# Patient Record
Sex: MALE | Race: WHITE | Employment: FULL TIME | URBAN - NONMETROPOLITAN AREA
[De-identification: names, ages, dates, MRNs, and addresses within clinical notes are randomized per-mention and may not be internally consistent; named-entity substitution may affect disease eponyms.]

---

## 2017-11-20 ENCOUNTER — ANESTHESIA (OUTPATIENT)
Dept: ENDOSCOPY | Age: 32
End: 2017-11-20
Payer: COMMERCIAL

## 2017-11-20 ENCOUNTER — HOSPITAL ENCOUNTER (OUTPATIENT)
Age: 32
Setting detail: OUTPATIENT SURGERY
Discharge: HOME | End: 2017-11-20
Attending: INTERNAL MEDICINE | Admitting: INTERNAL MEDICINE
Payer: COMMERCIAL

## 2017-11-20 ENCOUNTER — ANESTHESIA EVENT (OUTPATIENT)
Dept: ENDOSCOPY | Age: 32
End: 2017-11-20
Payer: COMMERCIAL

## 2017-11-20 VITALS
RESPIRATION RATE: 16 BRPM | OXYGEN SATURATION: 99 % | SYSTOLIC BLOOD PRESSURE: 103 MMHG | DIASTOLIC BLOOD PRESSURE: 61 MMHG

## 2017-11-20 VITALS
SYSTOLIC BLOOD PRESSURE: 121 MMHG | WEIGHT: 175 LBS | OXYGEN SATURATION: 98 % | BODY MASS INDEX: 24.5 KG/M2 | HEIGHT: 71 IN | TEMPERATURE: 97.8 F | RESPIRATION RATE: 20 BRPM | HEART RATE: 57 BPM | DIASTOLIC BLOOD PRESSURE: 69 MMHG

## 2017-11-20 PROCEDURE — 6360000002 HC RX W HCPCS: Performed by: NURSE ANESTHETIST, CERTIFIED REGISTERED

## 2017-11-20 PROCEDURE — 88305 TISSUE EXAM BY PATHOLOGIST: CPT

## 2017-11-20 PROCEDURE — 2500000003 HC RX 250 WO HCPCS: Performed by: NURSE ANESTHETIST, CERTIFIED REGISTERED

## 2017-11-20 PROCEDURE — 3700000001 HC ADD 15 MINUTES (ANESTHESIA): Performed by: INTERNAL MEDICINE

## 2017-11-20 PROCEDURE — 3609010300 HC COLONOSCOPY W/BIOPSY SINGLE/MULTIPLE: Performed by: INTERNAL MEDICINE

## 2017-11-20 PROCEDURE — 3700000000 HC ANESTHESIA ATTENDED CARE: Performed by: INTERNAL MEDICINE

## 2017-11-20 PROCEDURE — 2580000003 HC RX 258: Performed by: INTERNAL MEDICINE

## 2017-11-20 PROCEDURE — 45380 COLONOSCOPY AND BIOPSY: CPT | Performed by: INTERNAL MEDICINE

## 2017-11-20 PROCEDURE — 7100000010 HC PHASE II RECOVERY - FIRST 15 MIN: Performed by: INTERNAL MEDICINE

## 2017-11-20 PROCEDURE — 7100000011 HC PHASE II RECOVERY - ADDTL 15 MIN: Performed by: INTERNAL MEDICINE

## 2017-11-20 RX ORDER — SODIUM CHLORIDE, SODIUM LACTATE, POTASSIUM CHLORIDE, CALCIUM CHLORIDE 600; 310; 30; 20 MG/100ML; MG/100ML; MG/100ML; MG/100ML
INJECTION, SOLUTION INTRAVENOUS CONTINUOUS
Status: DISCONTINUED | OUTPATIENT
Start: 2017-11-20 | End: 2017-11-20 | Stop reason: HOSPADM

## 2017-11-20 RX ORDER — LIDOCAINE HYDROCHLORIDE 10 MG/ML
1 INJECTION, SOLUTION EPIDURAL; INFILTRATION; INTRACAUDAL; PERINEURAL ONCE
Status: DISCONTINUED | OUTPATIENT
Start: 2017-11-20 | End: 2017-11-20 | Stop reason: HOSPADM

## 2017-11-20 RX ORDER — LIDOCAINE HYDROCHLORIDE 20 MG/ML
INJECTION, SOLUTION INFILTRATION; PERINEURAL PRN
Status: DISCONTINUED | OUTPATIENT
Start: 2017-11-20 | End: 2017-11-20 | Stop reason: SDUPTHER

## 2017-11-20 RX ORDER — MESALAMINE 4 G/60ML
4 ENEMA RECTAL NIGHTLY
Qty: 1800 ML | Refills: 3 | Status: SHIPPED | OUTPATIENT
Start: 2017-11-20 | End: 2017-11-29

## 2017-11-20 RX ORDER — FENTANYL CITRATE 50 UG/ML
INJECTION, SOLUTION INTRAMUSCULAR; INTRAVENOUS PRN
Status: DISCONTINUED | OUTPATIENT
Start: 2017-11-20 | End: 2017-11-20 | Stop reason: SDUPTHER

## 2017-11-20 RX ORDER — PROPOFOL 10 MG/ML
INJECTION, EMULSION INTRAVENOUS PRN
Status: DISCONTINUED | OUTPATIENT
Start: 2017-11-20 | End: 2017-11-20 | Stop reason: SDUPTHER

## 2017-11-20 RX ORDER — MIDAZOLAM HYDROCHLORIDE 1 MG/ML
INJECTION INTRAMUSCULAR; INTRAVENOUS PRN
Status: DISCONTINUED | OUTPATIENT
Start: 2017-11-20 | End: 2017-11-20 | Stop reason: SDUPTHER

## 2017-11-20 RX ADMIN — FENTANYL CITRATE 50 MCG: 50 INJECTION, SOLUTION INTRAMUSCULAR; INTRAVENOUS at 11:29

## 2017-11-20 RX ADMIN — SODIUM CHLORIDE, SODIUM LACTATE, POTASSIUM CHLORIDE, AND CALCIUM CHLORIDE: 600; 310; 30; 20 INJECTION, SOLUTION INTRAVENOUS at 11:26

## 2017-11-20 RX ADMIN — MIDAZOLAM HYDROCHLORIDE 2 MG: 1 INJECTION, SOLUTION INTRAMUSCULAR; INTRAVENOUS at 11:27

## 2017-11-20 RX ADMIN — PROPOFOL 250 MG: 10 INJECTION, EMULSION INTRAVENOUS at 11:27

## 2017-11-20 RX ADMIN — LIDOCAINE HYDROCHLORIDE 40 MG: 20 INJECTION, SOLUTION INFILTRATION; PERINEURAL at 11:29

## 2017-11-20 NOTE — OP NOTE
Patient: Thierno Koch : 1985  Med Rec#: [id number] Acc#: [id number]   Primary Care Provider No primary care provider on file. Date of Procedure:  2017    Endoscopist: Luana Alicea MD    Referring Provider: Nick Puentes    Operation Performed: Colonoscopy with biopsy    Indications: rectal bleeding and loose stools for past few months, occ abdominal discomfort    Anesthesia:  Sedation was administered by anesthesia who monitored the patient during the procedure. I met with Thierno Koch prior to procedure. We discussed the procedure itself, and I have discussed the risks of endoscopy (including-- but not limited to-- pain, discomfort, bleeding potentially requiring second endoscopic procedure and/or blood transfusion, organ perforation requiring operative repair, damage to organs near the colon, infection, aspiration, cardiopulmonary/allergic reaction), benefits, indications to endoscopy. Additionally, we discussed options other than colonoscopy. The patient expressed understanding. All questions answered. The patient decided to proceed with the procedure. Signed informed consent was placed on the chart. Blood Loss: minimal    Withdrawal time: > 6 minutes  Bowel Prep: adequate     Complications: no immediate complications    DESCRIPTION OF PROCEDURE:     A time out was performed. After written informed consent was obtained, the patient was placed in the left lateral position. The perianal area was inspected, and a digital rectal exam was performed. A rectal exam was performed: normal tone, no palpable lesions. At this point, a forward viewing Olympus colonoscope was inserted into the anus and carefully advanced to the terminal ileum. The cecum was identified by the ileocecal valve and the appendiceal orifice. The colonoscope was then slowly withdrawn with careful inspection of the mucosa in a linear and circumferential fashion. The scope was retroflexed in the rectum.  Suction was utilized during the procedure to remove as much air as possible from the bowel. The colonoscope was removed from the patient, and the procedure was terminated. Findings are listed below. Findings: The mucosa appeared normal in the terminal ileum  Abnormal, friable, ulcerated mucosa was seen from the dentate line to the level of 15cm from the anus. The remaining sigmoid colon, left colon, transverse colon, and right colon appeared normal.   Endoscopically, this appeared most c/w ulcerative proctitis/colitis. Multiple random biopsies were obtained for histology. Other random colon biopsies were obtained from the normal appearing portions of the colon as well. Retroflexion in the rectum revealed no further abnormalities other than inflammation throughout the rectum. Recommendations:  1. Repeat colonoscopy: pending pathology  2. Await biopsy results-you will receive a letter with your results within 7-10 days  3. Start mesalamine enemas due to location of disease. 4. We will await pathology results. If symptoms cannot be controlled with NV therapy, then oral mesalamine may be warranted for adequate symptom improvement. 5.  Schedule f/u OV with me in 6-8 weeks. Findings and recommendations were discussed w/ the patient. A copy of the images was provided.     Juan Diego Braun MD  11/20/2017  11:48 AM

## 2017-11-20 NOTE — ANESTHESIA POSTPROCEDURE EVALUATION
Department of Anesthesiology  Postprocedure Note    Patient: Melodie Mckeon  MRN: [id number]  YOB: 1985  Date of evaluation: 11/20/2017  Time:  11:50 AM     Procedure Summary     Date:  11/20/17 Room / Location:  [facility] ENDO 10 / [facility]    Anesthesia Start:  1126 Anesthesia Stop:      Procedure:  COLONOSCOPY WITH BIOPSY (N/A ) Diagnosis:  (SCREEN (OA))    Surgeon:  Homer Richey MD Responsible Provider:  Vasquez Mae CRNA    Anesthesia Type:  general ASA Status:  1          Anesthesia Type: general    Ana Maria Phase I: Ana Maria Score: 10    Ana Maria Phase II:      Last vitals: Reviewed and per EMR flowsheets.        Anesthesia Post Evaluation    Patient location during evaluation: bedside  Patient participation: complete - patient participated  Level of consciousness: sleepy but conscious  Pain score: 0  Airway patency: patent  Nausea & Vomiting: no nausea and no vomiting  Complications: no  Cardiovascular status: hemodynamically stable and blood pressure returned to baseline  Respiratory status: acceptable and nasal cannula  Hydration status: stable

## 2017-11-20 NOTE — H&P
[]Comments:  Neuro/Mental Status:  [x]WNL  []Comments:  Abdominal:  [x]WNL    []Comments:  Other:   []WNL  []Comments:    Informed Consent:  The risks and benefits of the procedure have been discussed with either the patient or if they cannot consent, their representative. Assessment:  Patient examined and appropriate for planned sedation and procedure. Plan:  Proceed with planned sedation and procedure as above.     Adams Salinas MD

## 2017-11-20 NOTE — ANESTHESIA PRE PROCEDURE
Department of Anesthesiology  Preprocedure Note       Name:  Abilio Hinojosa   Age:  28 y.o.  :  1985                                          MRN:  [id number]         Date:  2017      Surgeon: Nina Martinez):  Lorelei Espinoza MD    Procedure: Procedure(s):  COLONOSCOPY DIAGNOSTIC OR SCREENING    Medications prior to admission:   Prior to Admission medications    Not on File       Current medications:    No current facility-administered medications for this encounter. Allergies: Allergies not on file    Problem List:  There is no problem list on file for this patient. Past Medical History:  No past medical history on file. Past Surgical History:  No past surgical history on file. Social History:    Social History   Substance Use Topics    Smoking status: Not on file    Smokeless tobacco: Not on file    Alcohol use Not on file                                Counseling given: Not Answered      Vital Signs (Current): There were no vitals filed for this visit. BP Readings from Last 3 Encounters:   No data found for BP       NPO Status:                                                                                 BMI:   Wt Readings from Last 3 Encounters:   No data found for Wt     There is no height or weight on file to calculate BMI.    CBC: No results found for: WBC, RBC, HGB, HCT, MCV, RDW, PLT    CMP: No results found for: NA, K, CL, CO2, BUN, CREATININE, GFRAA, AGRATIO, LABGLOM, GLUCOSE, PROT, CALCIUM, BILITOT, ALKPHOS, AST, ALT    POC Tests: No results for input(s): POCGLU, POCNA, POCK, POCCL, POCBUN, POCHEMO, POCHCT in the last 72 hours.     Coags: No results found for: PROTIME, INR, APTT    HCG (If Applicable): No results found for: PREGTESTUR, PREGSERUM, HCG, HCGQUANT     ABGs: No results found for: PHART, PO2ART, FPQ2DNC, YJI7VZQ, BEART, D9TCVGIB     Type & Screen (If Applicable):  No results found for: LABABO, [address]    Anesthesia Evaluation  Patient summary reviewed no history of anesthetic complications:   Airway: Mallampati: I  TM distance: >3 FB   Neck ROM: full  Mouth opening: > = 3 FB Dental: normal exam         Pulmonary:Negative Pulmonary ROS breath sounds clear to auscultation                             Cardiovascular:Negative CV ROS  Exercise tolerance: good (>4 METS),                     Neuro/Psych:   Negative Neuro/Psych ROS              GI/Hepatic/Renal:   (+) bowel prep,      (-) GERD       Endo/Other: Negative Endo/Other ROS                    Abdominal:           Vascular: negative vascular ROS. Anesthesia Plan      general     ASA 1       Induction: intravenous. Anesthetic plan and risks discussed with patient and spouse. Plan discussed with CRNA.     Attending anesthesiologist reviewed and agrees with Stephanie Camacho CRNA   11/20/2017

## 2017-11-21 ENCOUNTER — TELEPHONE (OUTPATIENT)
Dept: GASTROENTEROLOGY | Age: 32
End: 2017-11-21

## 2017-11-21 NOTE — TELEPHONE ENCOUNTER
I left a return voice mail on patient's cell phone stating we knew his medication needed authorization and we sent it in this morning (this was scanned into Epic) also as I was looking it up on CoverMyMeds, it was approved, [id number] effective 11/21/17-11/21/18. Advised on vm for patient to have his pharmacy rerun it to make it go through. I will also fax what I got off CoverMyMeds to the pharmacy.

## 2017-11-29 DIAGNOSIS — K51.30 ULCERATIVE RECTOSIGMOIDITIS WITHOUT COMPLICATION (HCC): Primary | ICD-10-CM

## 2017-11-29 RX ORDER — MESALAMINE 1.2 G/1
1200 TABLET, DELAYED RELEASE ORAL
Qty: 30 TABLET | Refills: 3 | Status: SHIPPED | OUTPATIENT
Start: 2017-11-29 | End: 2017-11-30

## 2017-11-29 RX ORDER — HYDROCORTISONE 100 MG/60ML
100 SUSPENSION RECTAL 2 TIMES DAILY
Qty: 42 ENEMA | Refills: 2 | Status: SHIPPED | OUTPATIENT
Start: 2017-11-29 | End: 2017-12-20

## 2017-11-30 RX ORDER — MESALAMINE 0.38 G/1
1.5 CAPSULE, EXTENDED RELEASE ORAL EVERY MORNING
Qty: 120 CAPSULE | Refills: 3 | Status: SHIPPED | OUTPATIENT
Start: 2017-11-30 | End: 2020-01-14

## 2017-12-04 ENCOUNTER — TELEPHONE (OUTPATIENT)
Dept: GASTROENTEROLOGY | Age: 32
End: 2017-12-04

## 2017-12-04 DIAGNOSIS — K51.211 ULCERATIVE PROCTITIS WITH RECTAL BLEEDING (HCC): Primary | ICD-10-CM

## 2017-12-04 RX ORDER — BALSALAZIDE DISODIUM 750 MG/1
2250 CAPSULE ORAL 3 TIMES DAILY
Qty: 90 CAPSULE | Refills: 3 | Status: SHIPPED | OUTPATIENT
Start: 2017-12-04 | End: 2020-01-14

## 2017-12-04 NOTE — TELEPHONE ENCOUNTER
\Colonoscopy completed per  on 11-20-17. [address] in [place] called today from [phone] stating the Amada Diaz will cost the patient a large co-pay, Rajani Pharmacist said his insurance recommends either Sulfasalazine or Balsalzide if  will approve one of the above.  cma      I have sent in an order for Balsalazide TID. Please call patient and let him know.  Thanks

## 2019-11-20 ENCOUNTER — TELEPHONE (OUTPATIENT)
Dept: GASTROENTEROLOGY | Age: 34
End: 2019-11-20

## 2020-01-14 ENCOUNTER — OFFICE VISIT (OUTPATIENT)
Dept: GASTROENTEROLOGY | Age: 35
End: 2020-01-14
Payer: COMMERCIAL

## 2020-01-14 VITALS
SYSTOLIC BLOOD PRESSURE: 98 MMHG | BODY MASS INDEX: 24.16 KG/M2 | WEIGHT: 172.6 LBS | OXYGEN SATURATION: 98 % | HEART RATE: 80 BPM | DIASTOLIC BLOOD PRESSURE: 62 MMHG | HEIGHT: 71 IN

## 2020-01-14 PROCEDURE — 99213 OFFICE O/P EST LOW 20 MIN: CPT | Performed by: INTERNAL MEDICINE

## 2020-01-14 RX ORDER — MESALAMINE 1.2 G/1
4800 TABLET, DELAYED RELEASE ORAL
Qty: 120 TABLET | Refills: 3 | Status: SHIPPED | OUTPATIENT
Start: 2020-01-14 | End: 2021-02-18 | Stop reason: SDUPTHER

## 2020-01-14 RX ORDER — PREDNISONE 10 MG/1
TABLET ORAL
Qty: 100 TABLET | Refills: 3 | Status: SHIPPED | OUTPATIENT
Start: 2020-01-14 | End: 2021-02-18 | Stop reason: ALTCHOICE

## 2020-01-14 SDOH — HEALTH STABILITY: MENTAL HEALTH: HOW MANY STANDARD DRINKS CONTAINING ALCOHOL DO YOU HAVE ON A TYPICAL DAY?: 1 OR 2

## 2020-01-14 NOTE — PROGRESS NOTES
Pulmonary effort is normal. No respiratory distress. Breath sounds: Normal breath sounds. No wheezing, rhonchi or rales. Abdominal:      General: Bowel sounds are normal. There is no distension. Palpations: Abdomen is soft. There is no hepatomegaly or mass. Tenderness: There is no abdominal tenderness. There is no guarding or rebound. Skin:     General: Skin is warm and dry. Coloration: Skin is not pale. Neurological:      Mental Status: He is alert and oriented to person, place, and time. Psychiatric:         Behavior: Behavior normal.         Thought Content: Thought content normal.         Labs:  No visits with results within 1 Month(s) from this visit. Latest known visit with results is:   No results found for any previous visit.

## 2020-02-17 ASSESSMENT — ENCOUNTER SYMPTOMS
CHEST TIGHTNESS: 0
VOMITING: 0
VOICE CHANGE: 0
COUGH: 0
BLOOD IN STOOL: 0
ABDOMINAL PAIN: 0
CONSTIPATION: 0
RECTAL PAIN: 0
BACK PAIN: 0
ALLERGIC/IMMUNOLOGIC NEGATIVE: 1
SHORTNESS OF BREATH: 0
TROUBLE SWALLOWING: 0
SORE THROAT: 0
EYES NEGATIVE: 1
DIARRHEA: 0
NAUSEA: 0

## 2021-02-18 ENCOUNTER — TELEMEDICINE (OUTPATIENT)
Dept: GASTROENTEROLOGY | Age: 36
End: 2021-02-18
Payer: COMMERCIAL

## 2021-02-18 DIAGNOSIS — K51.211 ULCERATIVE PROCTITIS WITH RECTAL BLEEDING (HCC): Primary | ICD-10-CM

## 2021-02-18 PROCEDURE — 99214 OFFICE O/P EST MOD 30 MIN: CPT | Performed by: NURSE PRACTITIONER

## 2021-02-18 RX ORDER — MESALAMINE 1.2 G/1
4800 TABLET, DELAYED RELEASE ORAL
Qty: 120 TABLET | Refills: 11 | Status: SHIPPED | OUTPATIENT
Start: 2021-02-18 | End: 2021-03-22

## 2021-02-18 ASSESSMENT — ENCOUNTER SYMPTOMS
BLOOD IN STOOL: 1
CONSTIPATION: 0
DIARRHEA: 1
ABDOMINAL PAIN: 1
SHORTNESS OF BREATH: 0
CHOKING: 0
ANAL BLEEDING: 1
COUGH: 0
ABDOMINAL DISTENTION: 0
VOMITING: 0
RECTAL PAIN: 0
TROUBLE SWALLOWING: 0
NAUSEA: 0

## 2021-02-18 NOTE — PROGRESS NOTES
2021    TELEHEALTH EVALUATION -- Audio/Visual (During PPTQD-69 public health emergency)    HPI:    Kendall Olmedo (:  1985) has requested an audio/video evaluation for the following concern(s):  Chief Complaint   Patient presents with    Rectal Bleeding       Pt being seen today for c/o rectal bleeding. Pt has hx ulcerative proctosigmoiditis. Last visit was a year ago and he was restarted on Lialda 4.8 gm daily and Prednisone taper at that time. He reports this did seem to help, however, he admits to not refilling Lialda and follow up was postponed due to COVID 19 pandemic. Today, pt has been off of medication with symptoms worsening in the last few months. He reports some rectal bleeding, bloating, frequent BMs. Stools are semi-solid, mucous laden with several BM a day. He also c/o abdominal pain that comes and goes. Last Colonoscopy 2017 - ulcerative proctitis    Review of Systems   Constitutional: Negative for activity change, appetite change, fatigue, fever and unexpected weight change. HENT: Negative for trouble swallowing. Respiratory: Negative for cough, choking and shortness of breath. Cardiovascular: Negative for chest pain. Gastrointestinal: Positive for abdominal pain, anal bleeding, blood in stool and diarrhea (loose). Negative for abdominal distention, constipation, nausea, rectal pain and vomiting. Allergic/Immunologic: Negative for food allergies. All other systems reviewed and are negative. Prior to Visit Medications    Medication Sig Taking?  Authorizing Provider   mesalamine (LIALDA) 1.2 g EC tablet Take 4 tablets by mouth daily (with breakfast)  Logan Hernandez MD       Social History     Tobacco Use    Smoking status: Never Smoker    Smokeless tobacco: Former User   Substance Use Topics    Alcohol use: Yes     Drinks per session: 1 or 2     Comment: weekly    Drug use: Never        Allergies   Allergen Reactions    Bactrim [Sulfamethoxazole-Trimethoprim] Other (See Comments)     As a child   ,   Past Medical History:   Diagnosis Date    Ulcerative proctitis ([facility] [address]    ,   Past Surgical History:   Procedure Laterality Date    HERNIA REPAIR      at age [age] [de-identified]   Ivan Gudino HERNIA REPAIR  01/09/2020    Dr Alexander Alfaro Right     pinning    [place] COLONOSCOPY W/BIOPSY SINGLE/MULTIPLE N/A 11/20/2017    Dr Erick Tinsley-ulcerative proctitis   ,   Social History     Tobacco Use    Smoking status: Never Smoker    Smokeless tobacco: Former User   Substance Use Topics    Alcohol use: Yes     Drinks per session: 1 or 2     Comment: weekly    Drug use: Never   ,   Family History   Problem Relation Age of Onset    Other Maternal Grandfather         colitis    Colon Cancer Neg Hx     Colon Polyps Neg Hx     Esophageal Cancer Neg Hx     Liver Cancer Neg Hx     Liver Disease Neg Hx     Rectal Cancer Neg Hx     Stomach Cancer Neg Hx        PHYSICAL EXAMINATION:  [ INSTRUCTIONS:  \"[x]\" Indicates a positive item  \"[]\" Indicates a negative item  -- DELETE ALL ITEMS NOT EXAMINED]  Vital Signs: (As obtained by patient/caregiver or practitioner observation)    Blood pressure-  Heart rate-    Respiratory rate-    Temperature-  Pulse oximetry-     Constitutional: [x] Appears well-developed and well-nourished [x] No apparent distress      [] Abnormal-   Mental status  [x] Alert and awake  [x] Oriented to person/place/time [x]Able to follow commands      Eyes:  EOM    [x]  Normal  [] Abnormal-  Sclera  [x]  Normal  [] Abnormal -         Discharge [x]  None visible  [] Abnormal -    HENT:   [x] Normocephalic, atraumatic.   [] Abnormal   [x] Mouth/Throat: Mucous membranes are moist.     External Ears [x] Normal  [] Abnormal-     Neck: [x] No visualized mass     Pulmonary/Chest: [x] Respiratory effort normal.  [x] No visualized signs of difficulty breathing or respiratory distress        [] Abnormal-      Musculoskeletal:   [x] Normal gait with no signs of ataxia         [x] Normal range of motion of neck        [] Abnormal-       Neurological:        [x] No Facial Asymmetry (Cranial nerve 7 motor function) (limited exam to video visit)          [x] No gaze palsy        [] Abnormal-         Skin:        [x] No significant exanthematous lesions or discoloration noted on facial skin         [] Abnormal-            Psychiatric:       [x] Normal Affect [x] No Hallucinations        [] Abnormal-     Other pertinent observable physical exam findings-     ASSESSMENT/PLAN:  1. Ulcerative proctitis with rectal bleeding (HCC)    - Start Lialda 4.8 gm po daily   - Fecal calprotectin and labs (CBC,CMP, ESR, CRP)  - Follow up in 6-8 weeks or sooner if needed  - Call with any questions or concerns  - Schedule colonoscopy  Instruct on bowel prep. Nothing to eat or drink after midnight the day of the exam.  Unable to drive for 24 hours after the procedure. No aspirin or nonsteroidal anti-inflammatories for 5 days before procedure. I have discussed the benefits, alternatives, and risks (including bleeding, perforation and death)  for pursuing Endoscopy (EGD/Colonscopy/EUS/ERCP) with the patient and they are willing to continue. We also discussed the need for anesthesia, IV access, proper dietary changes, medication changes if necessary, and need for bowel prep (if ordered) prior to their Endoscopic procedure. They are aware they must have someone accompany them to their scheduled procedure to drive them home - they agree to the above and are willing to continue. Return for procedure follow up or sooner if needed. Linnea Alonso is a 28 y.o. male being evaluated by a Virtual Visit (video visit) encounter to address concerns as mentioned above. A caregiver was present when appropriate.  Due to this being a TeleHealth encounter (During GBTWA-85 public health emergency), evaluation of the following organ systems was limited: Vitals/Constitutional/EENT/Resp/CV/GI//MS/Neuro/Skin/Heme-Lymph-Imm. Pursuant to the emergency declaration under the [address] [place] [address], [address] [place] [address] and the [employer] Act, this Virtual Visit was conducted with patient's (and/or legal guardian's) consent, to reduce the patient's risk of exposure to COVID-19 and provide necessary medical care. The patient (and/or legal guardian) has also been advised to contact this office for worsening conditions or problems, and seek emergency medical treatment and/or call 911 if deemed necessary. Patient identification was verified at the start of the visit: Yes    Total time spent on this encounter: Not billed by time    Services were provided through a video synchronous discussion virtually to substitute for in-person clinic visit. Patient and provider were located at their individual homes. --Shon Duane, APRN - NP on 2/18/2021 at 11:33 AM    An electronic signature was used to authenticate this note.

## 2021-02-18 NOTE — PATIENT INSTRUCTIONS
Schedule colonoscopy. No aspirin, ibuprofen, naproxen, fish oil or vitamin E for 5 days before procedure. Do not eat or drink after midnight the day of the procedure. Allowed medications can be taken with a small sip of water. Please review your prep instructions for allowed medications. You will not be able to drive for 24 hours after the procedure due to sedation. You must have a responsible adult, 25 year or older, to accompany you and drive you home the day of your procedure. If you are on blood thinners, clearance from the prescribing physician will be obtained before your procedure is scheduled. If it is determined it is not safe to hold these medications for a short time an alternative procedure for evaluation may be recommended. Risks of colonoscopy include, but are not limited to, perforation, bleeding, and infection, Risk of perforation and bleeding are increased if there is a polyp removed. Anesthesia risks will be reviewed with you before the procedure by a member of the anesthesia department. Your physician may also schedule a follow up appointment with the nurse practitioner to discuss pathology, symptoms or to check if you have had any problems related to your procedure. If you prefer not to return to the office after your procedure please discuss this with your physician on the day of your colonoscopy. The physician will talk with you and/or your family after the procedure is completed. Final recommendations are based on the pathologist report if biopsies or specimens are taken. If polyps are removed during the procedure they will be sent to a pathologist for analysis. Unless you have a follow up appointment scheduled, you will be notified by mail of the pathology results within 4 weeks. If you have not received results after 4 weeks you may call the office to obtain this information. For Colonoscopy:   You will be given specific directions regarding colonoscopy, continue to drink plenty of clear fluids. It is important   to keep yourself hydrated before the exam.     Please follow all instructions as provided for cleansing the bowel. Failure to have an adequately prepped colon may cause you to have incomplete exam with further testing required.      [email or web address]

## 2021-03-18 ENCOUNTER — OFFICE VISIT (OUTPATIENT)
Age: 36
End: 2021-03-18

## 2021-03-18 ENCOUNTER — ANESTHESIA EVENT (OUTPATIENT)
Dept: OPERATING ROOM | Age: 36
End: 2021-03-18

## 2021-03-18 DIAGNOSIS — Z11.59 SCREENING FOR VIRAL DISEASE: Primary | ICD-10-CM

## 2021-03-18 PROCEDURE — 99999 PR OFFICE/OUTPT VISIT,PROCEDURE ONLY: CPT | Performed by: NURSE PRACTITIONER

## 2021-03-20 LAB — SARS-COV-2, NAA: NOT DETECTED

## 2021-03-22 ENCOUNTER — HOSPITAL ENCOUNTER (OUTPATIENT)
Age: 36
Setting detail: OUTPATIENT SURGERY
Discharge: HOME | End: 2021-03-22
Attending: INTERNAL MEDICINE | Admitting: INTERNAL MEDICINE
Payer: COMMERCIAL

## 2021-03-22 ENCOUNTER — APPOINTMENT (OUTPATIENT)
Dept: OPERATING ROOM | Age: 36
End: 2021-03-22

## 2021-03-22 ENCOUNTER — HOSPITAL ENCOUNTER (OUTPATIENT)
Age: 36
Setting detail: SPECIMEN
Discharge: HOME | End: 2021-03-22
Payer: COMMERCIAL

## 2021-03-22 ENCOUNTER — ANESTHESIA (OUTPATIENT)
Dept: OPERATING ROOM | Age: 36
End: 2021-03-22

## 2021-03-22 VITALS — OXYGEN SATURATION: 55 % | SYSTOLIC BLOOD PRESSURE: 86 MMHG | DIASTOLIC BLOOD PRESSURE: 42 MMHG

## 2021-03-22 VITALS
SYSTOLIC BLOOD PRESSURE: 95 MMHG | WEIGHT: 170 LBS | TEMPERATURE: 97.5 F | HEIGHT: 71 IN | BODY MASS INDEX: 23.8 KG/M2 | RESPIRATION RATE: 18 BRPM | HEART RATE: 57 BPM | DIASTOLIC BLOOD PRESSURE: 60 MMHG | OXYGEN SATURATION: 100 %

## 2021-03-22 PROCEDURE — G8918 PT W/O PREOP ORDER IV AB PRO: HCPCS

## 2021-03-22 PROCEDURE — 45380 COLONOSCOPY AND BIOPSY: CPT

## 2021-03-22 PROCEDURE — 45380 COLONOSCOPY AND BIOPSY: CPT | Performed by: INTERNAL MEDICINE

## 2021-03-22 PROCEDURE — 88305 TISSUE EXAM BY PATHOLOGIST: CPT

## 2021-03-22 PROCEDURE — G8907 PT DOC NO EVENTS ON DISCHARG: HCPCS

## 2021-03-22 RX ORDER — MIDAZOLAM HYDROCHLORIDE 1 MG/ML
INJECTION INTRAMUSCULAR; INTRAVENOUS PRN
Status: DISCONTINUED | OUTPATIENT
Start: 2021-03-22 | End: 2021-03-22 | Stop reason: SDUPTHER

## 2021-03-22 RX ORDER — SODIUM CHLORIDE 9 MG/ML
INJECTION, SOLUTION INTRAVENOUS CONTINUOUS PRN
Status: DISCONTINUED | OUTPATIENT
Start: 2021-03-22 | End: 2021-03-22 | Stop reason: SDUPTHER

## 2021-03-22 RX ORDER — PROPOFOL 10 MG/ML
INJECTION, EMULSION INTRAVENOUS PRN
Status: DISCONTINUED | OUTPATIENT
Start: 2021-03-22 | End: 2021-03-22 | Stop reason: SDUPTHER

## 2021-03-22 RX ORDER — SODIUM CHLORIDE 9 MG/ML
INJECTION, SOLUTION INTRAVENOUS CONTINUOUS
Status: DISCONTINUED | OUTPATIENT
Start: 2021-03-22 | End: 2021-03-22 | Stop reason: HOSPADM

## 2021-03-22 RX ORDER — LIDOCAINE HYDROCHLORIDE 10 MG/ML
INJECTION, SOLUTION INFILTRATION; PERINEURAL PRN
Status: DISCONTINUED | OUTPATIENT
Start: 2021-03-22 | End: 2021-03-22 | Stop reason: SDUPTHER

## 2021-03-22 RX ADMIN — SODIUM CHLORIDE: 9 INJECTION, SOLUTION INTRAVENOUS at 09:25

## 2021-03-22 RX ADMIN — LIDOCAINE HYDROCHLORIDE 40 MG: 10 INJECTION, SOLUTION INFILTRATION; PERINEURAL at 09:59

## 2021-03-22 RX ADMIN — SODIUM CHLORIDE: 9 INJECTION, SOLUTION INTRAVENOUS at 09:46

## 2021-03-22 RX ADMIN — PROPOFOL 230 MG: 10 INJECTION, EMULSION INTRAVENOUS at 09:59

## 2021-03-22 RX ADMIN — MIDAZOLAM HYDROCHLORIDE 2 MG: 1 INJECTION INTRAMUSCULAR; INTRAVENOUS at 09:49

## 2021-03-22 NOTE — H&P
Patient Name: Jessica Knowles  : 1985  MRN: [id number]  DATE: 21    Allergies: Allergies   Allergen Reactions    Bactrim [Sulfamethoxazole-Trimethoprim] Other (See Comments)     As a child        ENDOSCOPY  History and Physical    Procedure:    [x] Diagnostic Colonoscopy       [] Screening Colonoscopy  [] EGD      [] ERCP      [] EUS       [] Other    [x] Previous office notes/History and Physical reviewed from the patients chart. Please see EMR for further details of HPI. I have examined the patient's status immediately prior to the procedure and:      Indications/HPI:    []Abdominal Pain   []Cancer- GI/Lung     []Fhx of colon CA/polyps  []History of Polyps  []Barretts            []Melena  []Abnormal Imaging              []Dysphagia              []Persistent Pneumonia   []Anemia                            []Food Impaction        []History of Polyps  [] GI Bleed             []Pulmonary nodule/Mass   []Change in bowel habits []Heartburn/Reflux  [x]Rectal Bleed (BRBPR)  []Chest Pain - Non Cardiac []Heme (+) Stool []Ulcers  []Constipation  []Hemoptysis  []Varices  []Diarrhea  []Hypoxemia    []Nausea/Vomiting   []Screening   [x]Crohns/Colitis  []Other:     Anesthesia:   [x] MAC [] Moderate Sedation   [] General   [] None     ROS: 12 pt Review of Symptoms was negative unless mentioned above    Medications:   Prior to Admission medications    Medication Sig Start Date End Date Taking?  Authorizing Provider   mesalamine (LIALDA) 1.2 g EC tablet Take 4 tablets by mouth daily (with breakfast) 2/18/21 3/20/21  JOSH Brice - NP       Past Medical History:  Past Medical History:   Diagnosis Date    [facility]ative [facility]        Past Surgical History:  Past Surgical History:   Procedure Laterality Date    HERNIA REPAIR      at age [age] [de-identified]   Elfreda Almont HERNIA REPAIR  2020    Dr Kyler Clemens Right     pinning    [facility] COL[place] W/BIOPSY SINGLE/MULTIPLE N/A 2017    Dr Kavya Haynes Tinsley-ulcerative proctitis       Social History:  Social History     Tobacco Use    Smoking status: Never Smoker    Smokeless tobacco: Former User   Substance Use Topics    Alcohol use: Yes     Drinks per session: 1 or 2     Comment: weekly    Drug use: Never       Vital Signs: There were no vitals filed for this visit. Physical Exam:  Cardiac:  [x]WNL  []Comments:  Pulmonary:  [x]WNL   []Comments:  Neuro/Mental Status:  [x]WNL  []Comments:  Abdominal:  [x]WNL    []Comments:  Other:   []WNL  []Comments:    Informed Consent:  The risks and benefits of the procedure have been discussed with either the patient or if they cannot consent, their representative. Assessment:  Patient examined and appropriate for planned sedation and procedure. Plan:  Proceed with planned sedation and procedure as above. Debbie Polanco am scribing for and in the presence of Dr. Evelyne Banks MD.  Electronically signed by Robert Chavez RN on 3/22/2021 at 9:12 AM    I personally performed the services described in this documentation as scribed by Ana Nguyen, and it appears accurate and complete.      Evelyne Banks MD  3/22/2021

## 2021-03-22 NOTE — ANESTHESIA POSTPROCEDURE EVALUATION
Department of Anesthesiology  Postprocedure Note    Patient: Petrona Da Silva  MRN: [id number]  YOB: 1985  Date of evaluation: 3/22/2021  Time:  10:19 AM     Procedure Summary     Date: 03/22/21 Room / Location: [facility] ENDO 02 / [address] [place] [address]    Anesthesia Start: 3931 Anesthesia Stop:     Procedure: COLONOSCOPY WITH BIOPSY (N/A Abdomen) Diagnosis: (HX ULCERATIVE PROCTOSIGMOIDITIS, RB)    Surgeons: Louise Babin MD Responsible Provider: JOSH Camacho CRNA    Anesthesia Type: general, TIVA ASA Status: 1          Anesthesia Type: No value filed. Ana Maria Phase I:      Ana Maria Phase II:      Last vitals: Reviewed and per EMR flowsheets.        Anesthesia Post Evaluation    Patient location during evaluation: bedside  Patient participation: complete - patient participated  Level of consciousness: sleepy but conscious  Pain score: 0  Airway patency: patent  Nausea & Vomiting: no nausea and no vomiting  Complications: no  Cardiovascular status: hemodynamically stable and blood pressure returned to baseline  Respiratory status: acceptable  Hydration status: stable

## 2021-03-22 NOTE — OP NOTE
Patient: Junior Montoya : 1985  Med Rec#: [id number] Acc#: [id number]   Primary Care Provider No primary care provider on file. Date of Procedure:  3/22/2021    Endoscopist: Gris Bruce MD    Referring Provider: JOSH Ortiz    Operation Performed: Colonoscopy with biopsy     Indications: rectal bleeding, hx of ulcerative proctitis     Anesthesia:  Sedation was administered by anesthesia who monitored the patient during the procedure. I met with Junior Montoya prior to procedure. We discussed the procedure itself, and I have discussed the risks of endoscopy (including-- but not limited to-- pain, discomfort, bleeding potentially requiring second endoscopic procedure and/or blood transfusion, organ perforation requiring operative repair, damage to organs near the colon, infection, aspiration, cardiopulmonary/allergic reaction), benefits, indications to endoscopy. Additionally, we discussed options other than colonoscopy. The patient expressed understanding. All questions answered. The patient decided to proceed with the procedure. Signed informed consent was placed on the chart. Blood Loss: minimal    Withdrawal time: > 6 min  Bowel Prep: adequate     Complications: no immediate complications    DESCRIPTION OF PROCEDURE:     A time out was performed. After written informed consent was obtained, the patient was placed in the left lateral position. The perianal area was inspected, and a digital rectal exam was performed. A rectal exam was performed: normal tone, no palpable lesions. At this point, a forward viewing Olympus colonoscope was inserted into the anus and carefully advanced to the terminal ileum. The cecum was identified by the ileocecal valve and the appendiceal orifice. The colonoscope was then slowly withdrawn with careful inspection of the mucosa in a linear and circumferential fashion. The scope was retroflexed in the rectum.  Suction was utilized during the procedure to remove as much air as possible from the bowel. The colonoscope was removed from the patient, and the procedure was terminated. Findings are listed below. Findings: The terminal ileum had no obvious ulcerations, there was some questionable mucosal changes of inflammation- biopsied. There were circumferential mucosal changes from the anal verge up to 20 cm, consistent with ulcerative proctitis- biopsied. .    The remaining right colon, transverse colon, and left colon appeared normal- biopsied separately. Retroflexion in the rectum appeared otherwise normal.         Recommendations:  1. Repeat colonoscopy: pending pathology. 2. Await biopsy results-you will receive a letter with your results. 3. Continue Lialda at 4.8gm per day  4. Check CBC, CMP, ESR, CRP and Stool Calprotectin as ordered. 5. Schedule f/u OV with ANNALISA MORENO in 6 weeks. Findings and recommendations were discussed w/ the patient. A copy of the images was provided. Ken Odom am scribing for and in the presence of Dr. Cindy Gonzales MD.  Electronically signed by Pedrito Tapia RN on 3/22/2021 at 9:12 AM    I personally performed the services described in this documentation as scribed by Danisha Bazzi, and it appears accurate and complete.      Cindy Gonzales MD  3/22/2021

## 2021-03-22 NOTE — ANESTHESIA PRE PROCEDURE
Department of Anesthesiology  Preprocedure Note       Name:  Nelli Shahid   Age:  28 y.o.  :  1985                                          MRN:  [id number]         Date:  3/22/2021      Surgeon: Roro Maurice):  Melody Garner MD    Procedure: Procedure(s):  COLONOSCOPY DIAGNOSTIC OR SCREENING    Medications prior to admission:   Prior to Admission medications    Medication Sig Start Date End Date Taking? Authorizing Provider   mesalamine (LIALDA) 1.2 g EC tablet Take 4 tablets by mouth daily (with breakfast) 2/18/21 3/20/21  JOSH Salazar NP       Current medications:    No current outpatient medications on file. No current facility-administered medications for this visit. Allergies: Allergies   Allergen Reactions    Bactrim [Sulfamethoxazole-Trimethoprim] Other (See Comments)     As a child       Problem List:    Patient Active Problem List   Diagnosis Code    Ulcerative proctitis with rectal bleeding ([address] K51.211       Past Medical History:        Diagnosis Date    Ulcerative proctitis ([address]        Past Surgical History:        Procedure Laterality Date    HERNIA REPAIR      at age [age] [de-identified]   [facility] HERNIA REPAIR  2020    Dr eLtty López Right     pinning    [place] COLONOSCOPY W/BIOPSY SINGLE/MULTIPLE N/A 2017    Dr Mayito Tinsley-ulcerative proctitis       Social History:    Social History     Tobacco Use    Smoking status: Never Smoker    Smokeless tobacco: Former User   Substance Use Topics    Alcohol use: Yes     Drinks per session: 1 or 2     Comment: weekly                                Counseling given: Not Answered      Vital Signs (Current): There were no vitals filed for this visit.                                            BP Readings from Last 3 Encounters:   20 98/62   17 121/69   17 103/61       NPO Status:                                                                                 BMI:   Wt Readings from Last 3 Encounters: 01/14/20 172 lb 9.6 oz (78.3 kg)   11/20/17 175 lb (79.4 kg)     There is no height or weight on file to calculate BMI.    CBC: No results found for: WBC, RBC, HGB, HCT, MCV, RDW, PLT    CMP: No results found for: NA, K, CL, CO2, BUN, CREATININE, GFRAA, AGRATIO, LABGLOM, GLUCOSE, PROT, CALCIUM, BILITOT, ALKPHOS, AST, ALT    POC Tests: No results for input(s): POCGLU, POCNA, POCK, POCCL, POCBUN, POCHEMO, POCHCT in the last 72 hours. Coags: No results found for: PROTIME, INR, APTT    HCG (If Applicable): No results found for: PREGTESTUR, PREGSERUM, HCG, HCGQUANT     ABGs: No results found for: PHART, PO2ART, NWJ9INC, SQM7RNG, BEART, Y4CFFZWX     Type & Screen (If Applicable):  No results found for: LABABO, [address]    Anesthesia Evaluation  Patient summary reviewed no history of anesthetic complications:   Airway: Mallampati: I  TM distance: >3 FB   Neck ROM: full  Mouth opening: > = 3 FB Dental: normal exam         Pulmonary: breath sounds clear to auscultation                            ROS comment: Former smokeless tobacco user   Cardiovascular:Negative CV ROS  Exercise tolerance: good (>4 METS),            Beta Blocker:  Not on Beta Blocker         Neuro/Psych:   Negative Neuro/Psych ROS              GI/Hepatic/Renal:   (+) bowel prep,      (-) GERD      ROS comment: etoh use  Ulcerative proctitis . Endo/Other: Negative Endo/Other ROS                    Abdominal:           Vascular: negative vascular ROS. Anesthesia Plan      general and TIVA     ASA 1       Induction: intravenous. Anesthetic plan and risks discussed with patient.                       JOSH Mackey - CRNA   3/22/2021

## 2021-03-31 ENCOUNTER — TELEPHONE (OUTPATIENT)
Dept: GASTROENTEROLOGY | Age: 36
End: 2021-03-31

## 2021-03-31 NOTE — TELEPHONE ENCOUNTER
Left vm for pt that he is still needing to complete the fecal calprotectin, order is in Epic. Pt advised to call back if he has questions.

## 2021-04-14 ENCOUNTER — TELEPHONE (OUTPATIENT)
Dept: GASTROENTEROLOGY | Age: 36
End: 2021-04-14

## 2021-04-14 RX ORDER — MESALAMINE 1000 MG/1
1000 SUPPOSITORY RECTAL NIGHTLY
Qty: 30 SUPPOSITORY | Refills: 2 | Status: SHIPPED | OUTPATIENT
Start: 2021-04-14 | End: 2021-05-14

## 2021-04-14 NOTE — TELEPHONE ENCOUNTER
Recommendations:  1. Repeat colonoscopy: pending pathology. 2. Await biopsy results-you will receive a letter with your results. 3. Continue Lialda at 4.8gm per day  4. Check CBC, CMP, ESR, CRP and Stool Calprotectin as ordered. 5. Schedule f/u OV with ANNALISA MORENO in 6 weeks.      Findings and recommendations were discussed w/ the patient. A copy of the images was provided.     I, Marilynn Ramirez, am scribing for and in the presence of Dr. Emily Morgan MD.  Electronically signed by Pauline Duverney, RN on 3/22/2021 at 9:12 AM     I personally performed the services described in this documentation as scribed by Marilynn Ramirez, and it appears accurate and complete.      Emily Morgan MD  3/22/2021       Patient called from [phone]  Patient has FU as VV with [id number] on 4-19-21. CLN per  3-22-21, see above. Patient said Polina Valdez was suppose to have sent Rx for Suppositories to [facility] in [place] and Rx is not there. I will forward to  and  jaquelin to review. Patient is asking for a return call.   jaquelin

## 2021-04-14 NOTE — TELEPHONE ENCOUNTER
Yuvonne Duane can you send this in for patient please? Thanks    Pt advised Rupesh Serrano sent in meds, and reminded him again to do the stool testing.

## 2021-05-04 ENCOUNTER — TELEPHONE (OUTPATIENT)
Dept: GASTROENTEROLOGY | Age: 36
End: 2021-05-04

## 2021-05-05 ENCOUNTER — TELEPHONE (OUTPATIENT)
Dept: GASTROENTEROLOGY | Age: 36
End: 2021-05-05

## 2021-05-05 NOTE — TELEPHONE ENCOUNTER
05-04-21 Called spoke with the patient his next apt is on Friday May 7th. He is still needing his Fecal stool test done. Moved apt to 05-28-21. He will try to come by Friday with the stool sample and drop off to the lab. Patient apologized as that he and his wife have been tied  Up with their twins that have been running high temps this week.  Cv

## 2022-12-12 ENCOUNTER — TELEPHONE (OUTPATIENT)
Dept: GASTROENTEROLOGY | Age: 37
End: 2022-12-12

## 2022-12-12 NOTE — TELEPHONE ENCOUNTER
Bambi Kirsten called to reschedule a  appt with Maria Eugenia Barragan that he canceled in Sept. Didn't know if he would still need the fecal calprotectin prior to this appt. .     Please be advised that the best time to call him to accommodate their needs is Anytime. Thank you.

## 2022-12-12 NOTE — TELEPHONE ENCOUNTER
12-12-22 Called [facility] x 2 for the patient to return a call to the office. The lab that wasn't done was from .

## 2023-02-02 ENCOUNTER — OFFICE VISIT (OUTPATIENT)
Dept: GASTROENTEROLOGY | Age: 38
End: 2023-02-02
Payer: COMMERCIAL

## 2023-02-02 VITALS
BODY MASS INDEX: 23.94 KG/M2 | HEART RATE: 70 BPM | OXYGEN SATURATION: 98 % | DIASTOLIC BLOOD PRESSURE: 70 MMHG | WEIGHT: 171 LBS | HEIGHT: 71 IN | SYSTOLIC BLOOD PRESSURE: 115 MMHG

## 2023-02-02 DIAGNOSIS — K51.211 ULCERATIVE PROCTITIS WITH RECTAL BLEEDING (HCC): Primary | ICD-10-CM

## 2023-02-02 PROCEDURE — 99213 OFFICE O/P EST LOW 20 MIN: CPT | Performed by: NURSE PRACTITIONER

## 2023-02-02 RX ORDER — MESALAMINE 1.2 G/1
4800 TABLET, DELAYED RELEASE ORAL
Qty: 360 TABLET | Refills: 3 | Status: SHIPPED | OUTPATIENT
Start: 2023-02-02 | End: 2023-06-02

## 2023-02-02 ASSESSMENT — ENCOUNTER SYMPTOMS
CHOKING: 0
CONSTIPATION: 0
ABDOMINAL DISTENTION: 0
DIARRHEA: 1
COUGH: 0
TROUBLE SWALLOWING: 0
NAUSEA: 0
VOMITING: 0
BLOOD IN STOOL: 0
SHORTNESS OF BREATH: 0
ABDOMINAL PAIN: 0
RECTAL PAIN: 0
ANAL BLEEDING: 0

## 2023-02-02 NOTE — PROGRESS NOTES
Subjective:     Patient ID: Yg Lawrence is a 40 y.o. male  PCP: No primary care provider on file. Referring Provider: No ref. provider found    HPI  Patient presents to the office today with the following complaints: Follow-up      Pt seen today for follow up. Hx ulcerative proctitis. Today, pt denies any abdominal pain, bleeding. He reports 2-4 BM a day. Stools are described as loose, watery. Denies any nocturnal diarrhea or mucous in stool. Pt previously on Lialda 4.8 gm daily. He has been out of this medication for a while now. Last Colonoscopy 2021 [facility]    Assessment:     1. Ulcerative proctitis with rectal bleeding (HCC)            Plan:   - Restart Lialda 4.8 gm daily  - Follow up yearly or sooner if needed  - Plan for repeat Colonoscopy at next OV   - Call with any questions or concerns      Orders  No orders of the defined types were placed in this encounter.     Medications  Orders Placed This Encounter   Medications    mesalamine (LIALDA) 1.2 g EC tablet     Sig: Take 4 tablets by mouth daily (with breakfast)     Dispense:  360 tablet     Refill:  3         Patient History:     Past Medical History:   Diagnosis Date    Ulcerative proctitis ([facility]        Past Surgical History:   Procedure Laterality Date    COLONOSCOPY N/A 03/22/2021    Dr Jones Severs    COLONOSCOPY  11/20/2017    Dr Fabian Tinsley-Active proctitis, BCM X 2    HERNIA REPAIR      at age [age] [de-identified]    HERNIA REPAIR  01/09/2020    Dr Sloan Shallow Right     pinning    [place] COLONOSCOPY W/BIOPSY SINGLE/MULTIPLE N/A 11/20/2017    Dr Fabian Tinsley-Ulcerative proctitis       Family History   Problem Relation Age of Onset    Other Maternal Grandfather         colitis    Colon Cancer Neg Hx     Colon Polyps Neg Hx     Esophageal Cancer Neg Hx     Liver Cancer Neg Hx     Liver Disease Neg Hx     Rectal Cancer Neg Hx     Stomach Cancer Neg Hx        Social History     Socioeconomic History    Marital status:    Tobacco Use Smoking status: Never    Smokeless tobacco: Former   Vaping Use    Vaping Use: Never used   Substance and Sexual Activity    Alcohol use: Yes     Comment: weekly    Drug use: Never       Current Outpatient Medications   Medication Sig Dispense Refill    sertraline (ZOLOFT) 50 MG tablet TAKE 1 TABLET BY MOUTH DAILY      mesalamine (LIALDA) 1.2 g EC tablet Take 4 tablets by mouth daily (with breakfast) 360 tablet 3    mesalamine (CANASA) 1000 MG suppository Place 1 suppository rectally nightly For 4 weeks 30 suppository 2     No current facility-administered medications for this visit. Allergies   Allergen Reactions    Bactrim [Sulfamethoxazole-Trimethoprim] Other (See Comments)     As a child       Review of Systems   Constitutional:  Negative for activity change, appetite change, fatigue, fever and unexpected weight change. HENT:  Negative for trouble swallowing. Respiratory:  Negative for cough, choking and shortness of breath. Cardiovascular:  Negative for chest pain. Gastrointestinal:  Positive for diarrhea. Negative for abdominal distention, abdominal pain, anal bleeding, blood in stool, constipation, nausea, rectal pain and vomiting. Allergic/Immunologic: Negative for food allergies. All other systems reviewed and are negative. Objective:     /70 (Site: Left Upper Arm)   Pulse 70   Ht 5' 11\" (1.803 m)   Wt 171 lb (77.6 kg)   SpO2 98%   BMI 23.85 kg/m²     Physical Exam  Vitals reviewed. Constitutional:       General: He is not in acute distress. Appearance: He is well-developed. HENT:      Head: Normocephalic and atraumatic. Right Ear: External ear normal.      Left Ear: External ear normal.      Nose: Nose normal.   Eyes:      General: No scleral icterus. Right eye: No discharge. Left eye: No discharge. Conjunctiva/sclera: Conjunctivae normal.      Pupils: Pupils are equal, round, and reactive to light.    Cardiovascular:      Rate and Rhythm: Normal rate and regular rhythm. Heart sounds: Normal heart sounds. No murmur heard. Pulmonary:      Effort: Pulmonary effort is normal. No respiratory distress. Breath sounds: Normal breath sounds. No wheezing or rales. Abdominal:      General: Bowel sounds are normal. There is no distension. Palpations: Abdomen is soft. There is no mass. Tenderness: There is no abdominal tenderness. There is no guarding or rebound. Musculoskeletal:         General: Normal range of motion. Cervical back: Normal range of motion and neck supple. Skin:     General: Skin is warm and dry. Coloration: Skin is not pale. Neurological:      Mental Status: He is alert and oriented to person, place, and time.    Psychiatric:         Behavior: Behavior normal.

## 2023-10-16 ENCOUNTER — OFFICE VISIT (OUTPATIENT)
Dept: GASTROENTEROLOGY | Age: 38
End: 2023-10-16
Payer: COMMERCIAL

## 2023-10-16 VITALS
DIASTOLIC BLOOD PRESSURE: 80 MMHG | HEIGHT: 71 IN | OXYGEN SATURATION: 98 % | WEIGHT: 170 LBS | SYSTOLIC BLOOD PRESSURE: 115 MMHG | BODY MASS INDEX: 23.8 KG/M2 | HEART RATE: 71 BPM

## 2023-10-16 DIAGNOSIS — K51.211 ULCERATIVE PROCTITIS WITH RECTAL BLEEDING (HCC): Primary | ICD-10-CM

## 2023-10-16 PROCEDURE — 99214 OFFICE O/P EST MOD 30 MIN: CPT | Performed by: NURSE PRACTITIONER

## 2023-10-16 RX ORDER — PREDNISONE 10 MG/1
TABLET ORAL
Qty: 32 TABLET | Refills: 0 | Status: SHIPPED | OUTPATIENT
Start: 2023-10-16

## 2023-10-16 RX ORDER — MESALAMINE 1000 MG/1
1000 SUPPOSITORY RECTAL NIGHTLY
Qty: 30 SUPPOSITORY | Refills: 2 | Status: SHIPPED | OUTPATIENT
Start: 2023-10-16

## 2023-10-16 RX ORDER — MESALAMINE 1.2 G/1
4800 TABLET, DELAYED RELEASE ORAL
Qty: 360 TABLET | Refills: 3 | Status: SHIPPED | OUTPATIENT
Start: 2023-10-16

## 2023-10-16 ASSESSMENT — ENCOUNTER SYMPTOMS
TROUBLE SWALLOWING: 0
CONSTIPATION: 0
COUGH: 0
RECTAL PAIN: 0
SHORTNESS OF BREATH: 0
DIARRHEA: 1
ABDOMINAL PAIN: 1
NAUSEA: 0
CHOKING: 0
ANAL BLEEDING: 1
ABDOMINAL DISTENTION: 0
VOMITING: 0
BLOOD IN STOOL: 1

## 2023-10-16 NOTE — PROGRESS NOTES
Subjective:     Patient ID: Scar Nichole is a 45 y.o. male  PCP: No primary care provider on file. Referring Provider: No ref. provider found    HPI  Patient presents to the office today with the following complaints: Follow-up      Pt seen today for follow up. Hx Ulcerative proctitis. Currently treated with Lialda 4.8 gm daily. He reports flare began first of September. Recent Alpha Gal (+). Symptoms worse after eating, BM 7-8 times a day. He c/o bloody diarrhea, loose stools. Denies any nocturnal diarrhea. Symptoms worse in the mornings. He c/o urgency. Bleeding is with every BM. Last Colonoscopy 2021 - [facility]  Denies any family hx colon cancer or colon polyps    Assessment:     1. Ulcerative proctitis with rectal bleeding (HCC)            Plan:   - Check CRP, Fecal calprotectin, GI panel  - Continue Lialda 4.8 gm daily  - Canasa suppository qhs   - Add Prednisone quick taper   - Follow up in 8 weeks   - Schedule Colonoscopy  Instruct on bowel prep. Nothing to eat or drink after midnight the day of the exam.  Unable to drive for 24 hours after the procedure. No aspirin or nonsteroidal anti-inflammatories for 5 days before procedure. I have discussed the benefits, alternatives, and risks (including bleeding, perforation and death)  for pursuing Endoscopy (EGD/Colonscopy/EUS/ERCP) with the patient and they are willing to continue. We also discussed the need for anesthesia, IV access, proper dietary changes, medication changes if necessary, and need for bowel prep (if ordered) prior to their Endoscopic procedure. They are aware they must have someone accompany them to their scheduled procedure to drive them home - they agree to the above and are willing to continue.          Orders  Orders Placed This Encounter   Procedures    Gastrointestinal Panel, Molecular    C-Reactive Protein     Standing Status:   Future     Standing Expiration Date:   10/16/2024    Calprotectin Stool     Standing

## (undated) DEVICE — FORCEPS BX L240CM DIA2.4MM L NDL RAD JAW 4 133340

## (undated) DEVICE — ENDO KIT,LOURDES HOSPITAL: Brand: MEDLINE INDUSTRIES, INC.